# Patient Record
Sex: FEMALE | Race: WHITE | URBAN - METROPOLITAN AREA
[De-identification: names, ages, dates, MRNs, and addresses within clinical notes are randomized per-mention and may not be internally consistent; named-entity substitution may affect disease eponyms.]

---

## 2021-06-01 ENCOUNTER — RECORDS - HEALTHEAST (OUTPATIENT)
Dept: ADMINISTRATIVE | Facility: CLINIC | Age: 76
End: 2021-06-01

## 2025-04-29 ENCOUNTER — THERAPY VISIT (OUTPATIENT)
Dept: PHYSICAL THERAPY | Facility: CLINIC | Age: 80
End: 2025-04-29
Attending: PHYSICIAN ASSISTANT
Payer: COMMERCIAL

## 2025-04-29 DIAGNOSIS — M25.551 HIP PAIN, RIGHT: Primary | ICD-10-CM

## 2025-04-29 PROCEDURE — 97112 NEUROMUSCULAR REEDUCATION: CPT | Mod: GP | Performed by: PHYSICAL THERAPIST

## 2025-04-29 PROCEDURE — 97161 PT EVAL LOW COMPLEX 20 MIN: CPT | Mod: GP | Performed by: PHYSICAL THERAPIST

## 2025-04-29 NOTE — PROGRESS NOTES
"History of R MIGUELITO Sept 2024 .States that she had PT after the hip surgery and what was needed to be addressed wasn't - \"that the pelvis was out of line.\" She states she had a large hematoma and points to the anterior R hip in October after the MIGUELITO. - didn 't go away. Saw physician assistant said it was ok. Was in Oct after the surgery.   Dec 21 she had a fall on steps twisting her  R ankle. She was going down the steps missed the bottom one and fell. In March she went to [facility] which involved - high steps and ramps - and after this the R front hip pain occurred along withdecreased strength. She states she is having difficulty with stairs -  \" back to pulling self up with handrail.\", getting in and out of bed and car needs to lift the R leg. She is also having difficulty lifting foot to switch from brake to gas when driving. Pivots on her heel .     PHYSICAL THERAPY EVALUATION  Type of Visit: Evaluation           Fall Risk Screen:  Have you fallen 2 or more times in the past year?: No  Have you fallen and had an injury in the past year?: Yes      Subjective         Presenting condition or subjective complaint: mobility  Date of onset: 09/12/24 (date of MIGUELITO R)    Relevant medical history:     Dates & types of surgery:      Prior diagnostic imaging/testing results: X-ray     Prior therapy history for the same diagnosis, illness or injury: Yes pt 2023  hip prior to surgery and PT after surgery sept 2024    Prior Level of Function  Transfers: Independent  Ambulation: Independent  ADL: Independent  IADL: Housekeeping    Living Environment  Social support: With a significant other or spouse   Type of home: House; 2-story   Stairs to enter the home: Yes 2 Is there a railing: Yes     Ramp: No   Stairs inside the home: Yes 14 Is there a railing: Yes     Help at home: Home management tasks (cooking, cleaning)  Equipment owned: Straight Cane; Walker with wheels; Crutches; Bath bench; Dressing equipment "     Employment: No    Hobbies/Interests: piano  singing walking    Patient goals for therapy: pain free driving  getting into and out of car bed floor   use stairs   go on walks  have correct body alignment and correct posture  have a whole body set of exercises to keep my muscles toned and fit    Bought an aerobics stepper/step aerobics only at 3 inches.  Doesn't like exercise per say.  Doesn't want the rest her body to occur like the hip.      Pain assessment:  denies     Objective      Cognitive Status Examination  Orientation: Oriented to person, place and time   Level of Consciousness: Alert  Follows Commands and Answers Questions: 100% of the time, Follows multi step instructions  Personal Safety and Judgement: Intact  Memory:  no issues noted      INTEGUMENTARY:  possible edema of thigh appreciated through her clothing R thigh.  Tightness anterior hip and also states scar anterior. Did not observe the skin on this date.   POSTURE:  standing R ilium higher, R greater trochanter lower and popliteal space lower on the R, L shoulder higher, toe out on the R. From the side knees and hips slightly flexed.  SUPINE leg length R appears to be longer than the L, measurement from ASIS to medial malleoli 93 cm B.    PALPATION: tissue tightness quad and hip flexor R.   RANGE OF MOTION:  WFL AAROM , AROM weakness with hip flexion  tightness hip extension R  STRENGTH:  seated hip flexion not able to lift full Range and decreased force production with resistance, L 5/5, knee ext R 4/5 pain with resistance  L 5/5, knee flexion seated 5/5 B.  Sidelying hip abduction R 3+/5.     BED MOBILITY:  uses UE to assist R LE on and off the mat    TRANSFERS:  able to do sit to stand without UE support. Reports sitting on seat of car pivoting in and lifting R leg into the car.     GAIT:   Level of [place]: Independent  Assistive Device(s): None  Gait Deviations:  slightly slower pace, decreased step length B. Lacks hip extension on  the R     Stairs: to be assessed next session    SPECIAL TESTS  25 foot walk/7.62 m  7.41 sec without a device    5 Times Sit-to-Stand (5TSTS)  11.8 sec     Single Leg Stance Right (sec) 5.4 sec   Single Leg Stance Left (sec) Stopped her at 20 seconds   30 Second Sit to Stand (reps/height) 8 reps           Assessment & Plan   CLINICAL IMPRESSIONS  Medical Diagnosis: R hip pain    Treatment Diagnosis: impaired strength, functional mobility, decreased force production   Impression/Assessment: Patient is a 80 year old female with R hip weakness complaints.  The following significant findings have been identified: Decreased ROM/flexibility, Decreased joint mobility, Decreased strength, Impaired gait, Impaired muscle performance, Decreased activity tolerance, and Impaired posture. These impairments interfere with their ability to perform self care tasks, recreational activities, household chores, driving , household mobility, and community mobility as compared to previous level of function.     Clinical Decision Making (Complexity):  Clinical Presentation: Evolving/Changing  Clinical Presentation Rationale: based on medical and personal factors listed in PT evaluation  Clinical Decision Making (Complexity): Low complexity    PLAN OF CARE  Treatment Interventions:  Interventions: Gait Training, Manual Therapy, Neuromuscular Re-education, Therapeutic Activity, Therapeutic Exercise, Self-Care/Home Management    Long Term Goals     PT Goal 1  Goal Identifier: HEP  Goal Description: Kym to be independent in HEP to address her impairments safely and improve her overall function.  Rationale: to maximize safety and independence with performance of ADLs and functional tasks;to maximize safety and independence within the home;to maximize safety and independence within the community;to maximize safety and independence with transportation;to maximize safety and independence with self cares  Target Date: 07/28/25  PT Goal 2  Goal  Identifier: 30 sec sit to and from stand  Goal Description: Kym to improve her sit to and from stand reps from 8 to 13 reps to demonstrate improved functional strength and LE mm endurance.  Rationale: to maximize safety and independence with performance of ADLs and functional tasks;to maximize safety and independence within the home;to maximize safety and independence within the community;to maximize safety and independence with transportation;to maximize safety and independence with self cares  Goal Progress: EVAL 8 reps  Target Date: 07/28/25  PT Goal 3  Goal Identifier: SLS  Goal Description: Kym to complete SLS on the R x 20 sec or greater to demonstrate improved R LE stability and strength.  Rationale: to maximize safety and independence with self cares;to maximize safety and independence with transportation;to maximize safety and independence within the community;to maximize safety and independence within the home;to maximize safety and independence with performance of ADLs and functional tasks  Target Date: 07/28/25  PT Goal 4  Goal Identifier: strength  Goal Description: Kym to have improvement in her hip strength for R hip flexion to 5/5, hip abduction in sidelying 5/5 to allow for improvement in activity tolerance and overall function.  Rationale: to maximize safety and independence with self cares;to maximize safety and independence with transportation;to maximize safety and independence within the community;to maximize safety and independence within the home;to maximize safety and independence with performance of ADLs and functional tasks  Goal Progress: cindy seated hip flexion 2+/5 - not able to lift leg full ROM R, hip abduction R 3+/5 in sidelying overuse of hip flexors.  Target Date: 07/28/25  PT Goal 5  Goal Identifier: function  Goal Description: Kym to perform sit to and from supine and car transfers without needing to lift /assist her R leg with her UE.  Rationale: to maximize safety and  independence with transportation;to maximize safety and independence within the home;to maximize safety and independence with performance of ADLs and functional tasks  Target Date: 07/28/25      Frequency of Treatment: 8 session in 90 days  Duration of Treatment: 90 days    Recommended Referrals to Other Professionals:  none at this time  Education Assessment:   Learner/Method: Patient;Listening;Reading;Pictures/Video  Education Comments: purpose, POC and goals, HEP    Risks and benefits of evaluation/treatment have been explained.   Patient/Family/caregiver agrees with Plan of Care.     Evaluation Time:     PT Eval, Low Complexity Minutes (33373): 31    Signing Clinician: Francia Navarro, PT        [facility]                                                                                   OUTPATIENT PHYSICAL THERAPY      PLAN OF TREATMENT FOR OUTPATIENT REHABILITATION   Patient's Last Name, First Name, Kym Zhao YOB: 1945   Provider's Name   [facility]   Medical Record No.  [id number]     Onset Date: 09/12/24 (date of MIGUELITO R)  Start of Care Date: 04/29/25     Medical Diagnosis:  R hip pain      PT Treatment Diagnosis:  impaired strength, functional mobility, decreased force production Plan of Treatment  Frequency/Duration: 8 session in 90 days/ 90 days    Certification date from 04/29/25 to 07/28/25         See note for plan of treatment details and functional goals     Francia Navarro, PT                         I CERTIFY THE NEED FOR THESE SERVICES FURNISHED UNDER        THIS PLAN OF TREATMENT AND WHILE UNDER MY CARE .             Physician Signature               Date    X_____________________________________________________                  Referring Provider:  Grazyna Nielson    Initial Assessment  See Epic Evaluation- Start of Care Date: 04/29/25

## 2025-05-05 ENCOUNTER — THERAPY VISIT (OUTPATIENT)
Dept: PHYSICAL THERAPY | Facility: CLINIC | Age: 80
End: 2025-05-05
Attending: PHYSICIAN ASSISTANT
Payer: COMMERCIAL

## 2025-05-05 DIAGNOSIS — M25.551 HIP PAIN, RIGHT: Primary | ICD-10-CM

## 2025-05-05 PROCEDURE — 97140 MANUAL THERAPY 1/> REGIONS: CPT | Mod: GP | Performed by: PHYSICAL THERAPIST

## 2025-05-05 PROCEDURE — 97110 THERAPEUTIC EXERCISES: CPT | Mod: GP | Performed by: PHYSICAL THERAPIST

## 2025-05-12 ENCOUNTER — THERAPY VISIT (OUTPATIENT)
Dept: PHYSICAL THERAPY | Facility: CLINIC | Age: 80
End: 2025-05-12
Attending: PHYSICIAN ASSISTANT
Payer: COMMERCIAL

## 2025-05-12 DIAGNOSIS — M25.551 HIP PAIN, RIGHT: Primary | ICD-10-CM

## 2025-05-12 PROCEDURE — 97140 MANUAL THERAPY 1/> REGIONS: CPT | Mod: GP | Performed by: PHYSICAL THERAPIST

## 2025-05-12 PROCEDURE — 97110 THERAPEUTIC EXERCISES: CPT | Mod: GP | Performed by: PHYSICAL THERAPIST

## 2025-05-12 PROCEDURE — 97112 NEUROMUSCULAR REEDUCATION: CPT | Mod: GP | Performed by: PHYSICAL THERAPIST

## 2025-05-18 ENCOUNTER — HEALTH MAINTENANCE LETTER (OUTPATIENT)
Age: 80
End: 2025-05-18

## 2025-05-20 ENCOUNTER — THERAPY VISIT (OUTPATIENT)
Dept: PHYSICAL THERAPY | Facility: CLINIC | Age: 80
End: 2025-05-20
Attending: PHYSICIAN ASSISTANT
Payer: COMMERCIAL

## 2025-05-20 DIAGNOSIS — M25.551 HIP PAIN, RIGHT: Primary | ICD-10-CM

## 2025-05-20 PROCEDURE — 97110 THERAPEUTIC EXERCISES: CPT | Mod: GP | Performed by: PHYSICAL THERAPIST

## 2025-05-20 PROCEDURE — 97140 MANUAL THERAPY 1/> REGIONS: CPT | Mod: GP | Performed by: PHYSICAL THERAPIST

## 2025-06-03 ENCOUNTER — THERAPY VISIT (OUTPATIENT)
Dept: PHYSICAL THERAPY | Facility: CLINIC | Age: 80
End: 2025-06-03
Attending: PHYSICIAN ASSISTANT
Payer: COMMERCIAL

## 2025-06-03 DIAGNOSIS — M25.551 HIP PAIN, RIGHT: Primary | ICD-10-CM

## 2025-06-03 PROCEDURE — 97140 MANUAL THERAPY 1/> REGIONS: CPT | Mod: GP | Performed by: PHYSICAL THERAPIST

## 2025-06-03 PROCEDURE — 97110 THERAPEUTIC EXERCISES: CPT | Mod: GP | Performed by: PHYSICAL THERAPIST

## 2025-06-10 ENCOUNTER — THERAPY VISIT (OUTPATIENT)
Dept: PHYSICAL THERAPY | Facility: CLINIC | Age: 80
End: 2025-06-10
Attending: PHYSICIAN ASSISTANT
Payer: COMMERCIAL

## 2025-06-10 DIAGNOSIS — M25.551 HIP PAIN, RIGHT: Primary | ICD-10-CM

## 2025-06-10 PROCEDURE — 97110 THERAPEUTIC EXERCISES: CPT | Mod: GP | Performed by: PHYSICAL THERAPIST

## 2025-06-17 ENCOUNTER — THERAPY VISIT (OUTPATIENT)
Dept: PHYSICAL THERAPY | Facility: CLINIC | Age: 80
End: 2025-06-17
Attending: PHYSICIAN ASSISTANT
Payer: COMMERCIAL

## 2025-06-17 DIAGNOSIS — M25.551 HIP PAIN, RIGHT: Primary | ICD-10-CM

## 2025-06-17 PROCEDURE — 97110 THERAPEUTIC EXERCISES: CPT | Mod: GP | Performed by: PHYSICAL THERAPIST

## 2025-07-01 ENCOUNTER — THERAPY VISIT (OUTPATIENT)
Dept: PHYSICAL THERAPY | Facility: CLINIC | Age: 80
End: 2025-07-01
Attending: PHYSICIAN ASSISTANT
Payer: COMMERCIAL

## 2025-07-01 DIAGNOSIS — M25.551 HIP PAIN, RIGHT: Primary | ICD-10-CM

## 2025-07-01 PROCEDURE — 97110 THERAPEUTIC EXERCISES: CPT | Mod: GP | Performed by: PHYSICAL THERAPIST

## 2025-07-01 PROCEDURE — 97140 MANUAL THERAPY 1/> REGIONS: CPT | Mod: GP | Performed by: PHYSICAL THERAPIST

## 2025-07-08 ENCOUNTER — THERAPY VISIT (OUTPATIENT)
Dept: PHYSICAL THERAPY | Facility: CLINIC | Age: 80
End: 2025-07-08
Attending: PHYSICIAN ASSISTANT
Payer: COMMERCIAL

## 2025-07-08 DIAGNOSIS — M25.551 HIP PAIN, RIGHT: Primary | ICD-10-CM

## 2025-07-08 PROCEDURE — 97112 NEUROMUSCULAR REEDUCATION: CPT | Mod: GP | Performed by: PHYSICAL THERAPIST

## 2025-07-08 PROCEDURE — 97110 THERAPEUTIC EXERCISES: CPT | Mod: GP | Performed by: PHYSICAL THERAPIST

## 2025-07-08 PROCEDURE — 97530 THERAPEUTIC ACTIVITIES: CPT | Mod: GP | Performed by: PHYSICAL THERAPIST

## 2025-07-28 ENCOUNTER — THERAPY VISIT (OUTPATIENT)
Dept: PHYSICAL THERAPY | Facility: CLINIC | Age: 80
End: 2025-07-28
Attending: PHYSICIAN ASSISTANT
Payer: COMMERCIAL

## 2025-07-28 DIAGNOSIS — M25.551 HIP PAIN, RIGHT: Primary | ICD-10-CM

## 2025-07-28 PROCEDURE — 97110 THERAPEUTIC EXERCISES: CPT | Mod: GP | Performed by: PHYSICAL THERAPIST

## 2025-07-28 NOTE — PROGRESS NOTES
07/28/25 0500   Appointment Info   Total/Authorized Visits 9   Medical Diagnosis R hip pain   PT Tx Diagnosis impaired strength, functional mobility, decreased force production   Precautions/Limitations falls   Progress Note/Certification   Start of Care Date 04/29/25   Onset of illness/injury or Date of Surgery 09/12/24  (date of MIGUELITO R)   Therapy Frequency 4 session in 6 weeks   Predicted Duration 90 days   Certification date from 07/29/25   Certification date to 09/09/25   Progress Note Completed Date 07/28/25   GOALS   PT Goals 2;3;4;5   PT Goal 1   Goal Identifier HEP   Goal Description Kym to be independent in HEP to address her impairments safely and improve her overall function.   Rationale to maximize safety and independence with performance of ADLs and functional tasks;to maximize safety and independence within the home;to maximize safety and independence within the community;to maximize safety and independence with transportation;to maximize safety and independence with self cares   Target Date 07/28/25   PT Goal 2   Goal Identifier 30 sec sit to and from stand   Goal Description Kym to improve her sit to and from stand reps from 8 to 13 reps to demonstrate improved functional strength and LE mm endurance.   Rationale to maximize safety and independence with performance of ADLs and functional tasks;to maximize safety and independence within the home;to maximize safety and independence within the community;to maximize safety and independence with transportation;to maximize safety and independence with self cares   Goal Progress EVAL 8 reps  7/1/25 12 reps no pain.  Progressing to this goal  7/28/25 13.5 reps (stopped in standing) felt fine no pain.   Target Date 07/28/25   Date Met 07/28/25   PT Goal 3   Goal Identifier SLS   Goal Description Kym to complete SLS on the R x 20 sec or greater to demonstrate improved R LE stability and strength.   Rationale to maximize safety and independence with self  cares;to maximize safety and independence with transportation;to maximize safety and independence within the community;to maximize safety and independence within the home;to maximize safety and independence with performance of ADLs and functional tasks   Goal Progress 6/10/25 L leg stopped her at 30 sec slight posterior lean on pelvis.  R 30 sec but a challenge.  7/1/25 L stopped her at 32 sec.  R a couple practices of 1-2 sec and then able to do for 15 sec.   R 8 and then 18 sec, L 25 sec.   Target Date 07/28/25   PT Goal 4   Goal Identifier strength   Goal Description Kym to have improvement in her hip strength for R hip flexion to 5/5, hip abduction in sidelying 5/5 to allow for improvement in activity tolerance and overall function.   Rationale to maximize safety and independence with self cares;to maximize safety and independence with transportation;to maximize safety and independence within the community;to maximize safety and independence within the home;to maximize safety and independence with performance of ADLs and functional tasks   Goal Progress eval seated hip flexion 2+/5 - not able to lift leg full ROM R, hip abduction R 3+/5 in sidelying overuse of hip flexors. 7/1/25 4+ to 5/5  R hip flexion.   R hip abd in sidelying 5/5   Target Date 07/28/25   Date Met 07/01/25   PT Goal 5   Goal Identifier function   Goal Description Kym to perform sit to and from supine and car transfers without needing to lift /assist her R leg with her UE.   Rationale to maximize safety and independence with transportation;to maximize safety and independence within the home;to maximize safety and independence with performance of ADLs and functional tasks   Goal Progress 6/10/25 not lifting the R leg.   Target Date 07/28/25   Date Met 06/10/25         [facility]                                                                                   OUTPATIENT PHYSICAL THERAPY    PLAN OF TREATMENT FOR  OUTPATIENT REHABILITATION   Patient's Last Name, First Name, Kym Zhao YOB: 1945   Provider's Name   [facility]   Medical Record No.  [id number]     Onset Date: 09/12/24 (date of MIGUELITO R)  Start of Care Date: 04/29/25     Medical Diagnosis:  R hip pain      PT Treatment Diagnosis:  impaired strength, functional mobility, decreased force production Plan of Treatment  Frequency/Duration: (P) 4 session in 6 weeks    Certification date from (P) 07/29/25 to (P) 09/09/25         See note for plan of treatment details and functional goals     Francia Navarro, PT                         I CERTIFY THE NEED FOR THESE SERVICES FURNISHED UNDER        THIS PLAN OF TREATMENT AND WHILE UNDER MY CARE .             Physician Signature               Date    X_____________________________________________________                  Referring Provider:  Grazyna Nielson    Initial Assessment  See Epic Evaluation- Start of Care Date: 04/29/25        PLAN  Continue with PT to finalize HEP.      Beginning/End Dates of Progress Note Reporting Period:  4/29/25 to 07/28/2025.  Kym has progressed well.  Her hip pain has improved/not occurring on the R.  She did have a brief time of L hip pain due to incorrect mm performance with HEP. She continues to be appropriate for a couple more sessions for HEP development and reassurance she is performing correctly.      Referring Provider:  Grazyna Nielson

## 2025-08-11 ENCOUNTER — THERAPY VISIT (OUTPATIENT)
Dept: PHYSICAL THERAPY | Facility: CLINIC | Age: 80
End: 2025-08-11
Attending: PHYSICIAN ASSISTANT
Payer: COMMERCIAL

## 2025-08-11 DIAGNOSIS — M25.551 HIP PAIN, RIGHT: Primary | ICD-10-CM

## 2025-08-11 PROCEDURE — 97110 THERAPEUTIC EXERCISES: CPT | Mod: GP | Performed by: PHYSICAL THERAPIST

## 2025-08-25 ENCOUNTER — THERAPY VISIT (OUTPATIENT)
Dept: PHYSICAL THERAPY | Facility: CLINIC | Age: 80
End: 2025-08-25
Attending: PHYSICIAN ASSISTANT
Payer: COMMERCIAL

## 2025-08-25 DIAGNOSIS — M25.551 HIP PAIN, RIGHT: Primary | ICD-10-CM

## 2025-08-25 PROCEDURE — 97110 THERAPEUTIC EXERCISES: CPT | Mod: GP | Performed by: PHYSICAL THERAPIST
